# Patient Record
Sex: MALE | Race: WHITE | NOT HISPANIC OR LATINO | Employment: FULL TIME | URBAN - METROPOLITAN AREA
[De-identification: names, ages, dates, MRNs, and addresses within clinical notes are randomized per-mention and may not be internally consistent; named-entity substitution may affect disease eponyms.]

---

## 2024-01-31 ENCOUNTER — OFFICE VISIT (OUTPATIENT)
Dept: URGENT CARE | Facility: CLINIC | Age: 54
End: 2024-01-31
Payer: COMMERCIAL

## 2024-01-31 VITALS
DIASTOLIC BLOOD PRESSURE: 102 MMHG | HEART RATE: 71 BPM | HEIGHT: 71 IN | WEIGHT: 250 LBS | RESPIRATION RATE: 20 BRPM | OXYGEN SATURATION: 97 % | SYSTOLIC BLOOD PRESSURE: 171 MMHG | TEMPERATURE: 99 F | BODY MASS INDEX: 35 KG/M2

## 2024-01-31 DIAGNOSIS — S05.02XA ABRASION OF LEFT CORNEA, INITIAL ENCOUNTER: Primary | ICD-10-CM

## 2024-01-31 PROCEDURE — 99203 OFFICE O/P NEW LOW 30 MIN: CPT | Mod: S$GLB,,, | Performed by: FAMILY MEDICINE

## 2024-01-31 RX ORDER — ERYTHROMYCIN 5 MG/G
OINTMENT OPHTHALMIC
Qty: 1 G | Refills: 0 | Status: SHIPPED | OUTPATIENT
Start: 2024-01-31

## 2024-01-31 NOTE — PROGRESS NOTES
"Subjective:      Patient ID: Isai Virk is a 53 y.o. male.    Vitals:  height is 5' 11" (1.803 m) and weight is 113.4 kg (250 lb). His oral temperature is 98.5 °F (36.9 °C). His blood pressure is 171/102 (abnormal) and his pulse is 71. His respiration is 20 and oxygen saturation is 97%.     Chief Complaint: Eye Problem    53-year-old male who has been experiencing some left eye irritation for about a week.  He concedes that he sometimes rubs his eyes, and 2 days ago he suddenly felt like perhaps something was in his eye.  It melvin and is a little light sensitive.  No change in vision.  He finds it uncomfortable/annoying but denies pain.  Tried to wash out the something that might be in his eye in his hotel sink but to no avail.  He does not wear contact lenses.      Vision Screening  Right eye - Without correction:   With correction: 20/20  Left eye - Without correction:   With correction: 20/20  Both eyes - Without correction:   With correction: 20/25       Eye Problem   The left eye is affected. This is a new problem. The current episode started in the past 7 days. The problem occurs constantly. The problem has been gradually worsening. The injury mechanism is unknown. The pain is at a severity of 0/10. The patient is experiencing no pain. There is No known exposure to pink eye. He Does not wear contacts. Associated symptoms include an eye discharge (very teary), eye redness, a foreign body sensation and photophobia (Only this afternoon in direct sunlight). Pertinent negatives include no blurred vision, double vision, fever, itching, nausea, recent URI or vomiting. He has tried water for the symptoms. The treatment provided no relief.       Constitution: Negative for fever.   Eyes:  Positive for eye discharge (very teary), eye redness and photophobia (Only this afternoon in direct sunlight). Negative for eye itching, double vision and blurred vision.   Gastrointestinal:  Negative for nausea and " vomiting.      Objective:     Physical Exam   Constitutional: He is oriented to person, place, and time. He appears well-developed.  Non-toxic appearance. He does not appear ill. No distress.   HENT:   Head: Normocephalic and atraumatic.   Ears:   Right Ear: External ear normal.   Left Ear: External ear normal.   Eyes: Pupils are equal, round, and reactive to light.     Extraocular movement intact      Comments: Injected left sclera.  Numbing drop administered and eye carefully examined, upper lid everted, without evidence of foreign body.  Fluorescein exam revealed a very small corneal abrasion where noted   Cardiovascular: Normal rate and regular rhythm.   Pulmonary/Chest: Effort normal. No stridor. No respiratory distress. He has no wheezes. He has no rhonchi. He has no rales.   Neurological: He is alert and oriented to person, place, and time.   Skin: Skin is not diaphoretic.   Psychiatric: His behavior is normal. Thought content normal.   Nursing note and vitals reviewed.      Assessment:     1. Abrasion of left cornea, initial encounter      Vision Screening    Right eye Left eye Both eyes   Without correction      With correction 20/20 20/20 20/25       Plan:       Abrasion of left cornea, initial encounter  -     erythromycin (ROMYCIN) ophthalmic ointment; 1/2 INCH RIBBON 3-4 TIMES DAILY FOR 3-5 DAYS  Dispense: 1 g; Refill: 0    I EXPECT THAT YOU SHOULD EXPERIENCE DAILY IMPROVEMENT.      RECHECK OR TO THE EMERGENCY ROOM IMMEDIATELY WITH ANY EYEBALL PAIN OR VISION CHANGE.

## 2024-01-31 NOTE — PATIENT INSTRUCTIONS
I EXPECT THAT YOU SHOULD EXPERIENCE DAILY IMPROVEMENT.      RECHECK OR TO THE EMERGENCY ROOM IMMEDIATELY WITH ANY EYEBALL PAIN OR VISION CHANGE.